# Patient Record
Sex: MALE | Race: WHITE
[De-identification: names, ages, dates, MRNs, and addresses within clinical notes are randomized per-mention and may not be internally consistent; named-entity substitution may affect disease eponyms.]

---

## 2018-08-13 ENCOUNTER — HOSPITAL ENCOUNTER (EMERGENCY)
Dept: HOSPITAL 82 - ED | Age: 63
Discharge: NURSING, REHAB OR LONG-TERM CARE | DRG: 918 | End: 2018-08-13
Payer: COMMERCIAL

## 2018-08-13 VITALS — BODY MASS INDEX: 33.87 KG/M2 | WEIGHT: 198.42 LBS | HEIGHT: 64 IN

## 2018-08-13 VITALS — DIASTOLIC BLOOD PRESSURE: 57 MMHG | SYSTOLIC BLOOD PRESSURE: 106 MMHG

## 2018-08-13 VITALS — DIASTOLIC BLOOD PRESSURE: 56 MMHG | SYSTOLIC BLOOD PRESSURE: 98 MMHG

## 2018-08-13 VITALS — SYSTOLIC BLOOD PRESSURE: 107 MMHG | DIASTOLIC BLOOD PRESSURE: 56 MMHG

## 2018-08-13 DIAGNOSIS — I10: ICD-10-CM

## 2018-08-13 DIAGNOSIS — D64.9: ICD-10-CM

## 2018-08-13 DIAGNOSIS — K62.5: ICD-10-CM

## 2018-08-13 DIAGNOSIS — Z86.718: ICD-10-CM

## 2018-08-13 DIAGNOSIS — T45.515A: Primary | ICD-10-CM

## 2018-08-13 LAB
ALBUMIN SERPL-MCNC: 4.4 G/DL (ref 3.2–5)
ALP SERPL-CCNC: 80 U/L (ref 38–126)
ALT SERPL-CCNC: 35 U/L (ref 11–66)
ANION GAP SERPL CALCULATED.3IONS-SCNC: 17 MMOL/L
AST SERPL-CCNC: 20 U/L (ref 19–48)
BASOPHILS NFR BLD AUTO: 1 % (ref 0–3)
BUN SERPL-MCNC: 17 MG/DL (ref 8–23)
BUN/CREAT SERPL: 14
CHLORIDE SERPL-SCNC: 107 MMOL/L (ref 95–108)
CO2 SERPL-SCNC: 25 MMOL/L (ref 22–30)
CREAT SERPL-MCNC: 1.3 MG/DL (ref 0.7–1.3)
EOSINOPHIL NFR BLD AUTO: 3 % (ref 0–8)
ERYTHROCYTE [DISTWIDTH] IN BLOOD BY AUTOMATED COUNT: 18.6 % (ref 11.5–15.5)
HCT VFR BLD AUTO: 27.5 % (ref 39–50)
HGB BLD-MCNC: 7.6 G/DL (ref 14–18)
IMM GRANULOCYTES NFR BLD: 0.3 % (ref 0–5)
INR PPP: 2.8 RATIO (ref 0.7–1.3)
LYMPHOCYTES NFR BLD: 18 % (ref 15–41)
MCH RBC QN AUTO: 20.2 PG  CALC (ref 26–32)
MCHC RBC AUTO-ENTMCNC: 27.6 G/L CALC (ref 32–36)
MCV RBC AUTO: 72.9 FL  CALC (ref 80–100)
MONOCYTES NFR BLD AUTO: 9 % (ref 2–13)
NEUTROPHILS # BLD AUTO: 4.62 THOU/UL (ref 1.82–7.42)
NEUTROPHILS NFR BLD AUTO: 68 % (ref 42–76)
PLATELET # BLD AUTO: 442 THOU/UL (ref 130–400)
POTASSIUM SERPL-SCNC: 4.7 MMOL/L (ref 3.5–5.1)
PROT SERPL-MCNC: 7.7 G/DL (ref 6.3–8.2)
PROTHROMBIN TIME: 31.7 SECONDS (ref 9–12.5)
RBC # BLD AUTO: 3.77 MILL/UL (ref 4.7–6.1)
SODIUM SERPL-SCNC: 144 MMOL/L (ref 137–146)

## 2018-08-13 PROCEDURE — 30233N1 TRANSFUSION OF NONAUTOLOGOUS RED BLOOD CELLS INTO PERIPHERAL VEIN, PERCUTANEOUS APPROACH: ICD-10-PCS | Performed by: FAMILY MEDICINE

## 2018-08-13 PROCEDURE — P9016 RBC LEUKOCYTES REDUCED: HCPCS

## 2022-03-03 ENCOUNTER — ANTICOAGULATION VISIT (OUTPATIENT)
Dept: CARDIAC SURGERY | Facility: CLINIC | Age: 67
End: 2022-03-03

## 2022-03-03 VITALS — HEART RATE: 110 BPM | OXYGEN SATURATION: 98 %

## 2022-03-03 DIAGNOSIS — I51.3 ATRIAL THROMBUS: ICD-10-CM

## 2022-03-03 DIAGNOSIS — Z51.81 ENCOUNTER FOR THERAPEUTIC DRUG MONITORING: ICD-10-CM

## 2022-03-03 DIAGNOSIS — Z79.01 LONG TERM (CURRENT) USE OF ANTICOAGULANTS: Primary | ICD-10-CM

## 2022-03-03 LAB — INR PPP: 1.3 (ref 0.9–1.1)

## 2022-03-03 PROCEDURE — 85610 PROTHROMBIN TIME: CPT | Performed by: NURSE PRACTITIONER

## 2022-03-03 PROCEDURE — 93793 ANTICOAG MGMT PT WARFARIN: CPT | Performed by: NURSE PRACTITIONER

## 2022-03-03 PROCEDURE — 36416 COLLJ CAPILLARY BLOOD SPEC: CPT | Performed by: NURSE PRACTITIONER

## 2022-03-03 NOTE — PROGRESS NOTES
PT here today for enrollment in [facility]. PT states he has a history of blood clots. PT states he was on coumadin once for about a year and was taken off. PT states he is unsure why he was placed back on coumadin but he has been back on it 2-3 years now. PT states he has a peach colored tablet (5mg) and he has been self dosing. PT states he can tell by the color of his stoma if his blood is thick or thin. PT was advised he would need to follow our instructions for dosing and not self dose; PT verbalized. PT states he will bring a medication list to next appt. PT states he has been taking 1/4 tablet for about a week. PT was given informational packet and key points reviewed. Educated pt on reasoning from Coumadin therapy. Explained mechanism of action with Coumadin and Vitamin k. Risk and benefits of therapy were explained including rationale for INR range. Stressed compliance and consistency with diet, medications and appts. PT was educated on interactions of other medications and instructed to call CC with any med changes immediately including those that are short term. Instructed pt to take Coumadin between supper time and bed time. PT was educated on expectations of visits including intervals and frequencies. Educated pt on dietary restrictions and Coumadin friendly diet. PT was given informational sheets regarding acceptable green vegs. Educated pt on reasoning behind not consuming those fruits and vegs.  PT was instructed to contact CC for any planned medical procedures and to make sure all providers are aware of Coumadin therapy. PT was educated on s/s of bleeding and to report bleeding to ER immediately. PT was advised to wear medical alert bracelet. Emphasized safety while on blood thinner including power tool safety and to always obtain CT scan for head/blunt trauma. PT was instructed that [facility]in Clinic would manage pt's INR and refill Coumadin. PT instructed on dosing, to hod green veggies, and  appt made for 4 days; PT verbalized. Approximately 25 minutes were spent with patient. Findings reported by Kimberly Stauffer RN.    Today's INR is Lab Results - Last 18 Months   Lab Units 03/03/22  0000   INR  1.30*

## 2022-03-07 ENCOUNTER — ANTICOAGULATION VISIT (OUTPATIENT)
Dept: CARDIAC SURGERY | Facility: CLINIC | Age: 67
End: 2022-03-07

## 2022-03-07 VITALS — OXYGEN SATURATION: 94 % | HEART RATE: 87 BPM

## 2022-03-07 DIAGNOSIS — I51.3 ATRIAL THROMBUS: ICD-10-CM

## 2022-03-07 DIAGNOSIS — Z79.01 LONG TERM (CURRENT) USE OF ANTICOAGULANTS: Primary | ICD-10-CM

## 2022-03-07 DIAGNOSIS — Z51.81 ENCOUNTER FOR THERAPEUTIC DRUG MONITORING: ICD-10-CM

## 2022-03-07 LAB — INR PPP: 1.3 (ref 0.9–1.1)

## 2022-03-07 PROCEDURE — 85610 PROTHROMBIN TIME: CPT | Performed by: NURSE PRACTITIONER

## 2022-03-07 PROCEDURE — 36416 COLLJ CAPILLARY BLOOD SPEC: CPT | Performed by: NURSE PRACTITIONER

## 2022-03-07 PROCEDURE — 93793 ANTICOAG MGMT PT WARFARIN: CPT | Performed by: NURSE PRACTITIONER

## 2022-03-07 RX ORDER — ISOSORBIDE DINITRATE 30 MG/1
30 TABLET ORAL DAILY
COMMUNITY
Start: 2022-05-19

## 2022-03-07 RX ORDER — WARFARIN SODIUM 5 MG/1
TABLET ORAL DAILY
COMMUNITY
End: 2022-03-22 | Stop reason: SDUPTHER

## 2022-03-07 RX ORDER — ONDANSETRON 8 MG/1
8 TABLET, ORALLY DISINTEGRATING ORAL
COMMUNITY
Start: 2021-11-04 | End: 2022-05-19

## 2022-03-07 RX ORDER — VITAMIN B COMPLEX
1 TABLET ORAL DAILY
COMMUNITY
Start: 2022-05-19

## 2022-03-07 RX ORDER — FERROUS SULFATE 325(65) MG
65 TABLET ORAL
COMMUNITY
Start: 2022-05-19

## 2022-03-07 RX ORDER — PSEUDOEPHEDRINE HCL 30 MG
200 TABLET ORAL
COMMUNITY
End: 2022-05-19

## 2022-03-07 RX ORDER — BISACODYL 5 MG/1
5 TABLET, DELAYED RELEASE ORAL DAILY PRN
COMMUNITY
Start: 2022-05-19

## 2022-03-07 RX ORDER — UREA 10 %
2 LOTION (ML) TOPICAL
COMMUNITY
End: 2022-05-19

## 2022-03-07 RX ORDER — SENNA PLUS 8.6 MG/1
1 TABLET ORAL EVERY OTHER DAY
COMMUNITY
End: 2022-05-19

## 2022-03-07 RX ORDER — PROCHLORPERAZINE MALEATE 10 MG
10 TABLET ORAL EVERY 6 HOURS PRN
COMMUNITY
Start: 2022-05-19

## 2022-03-07 RX ORDER — HYDROCODONE BITARTRATE AND ACETAMINOPHEN 5; 325 MG/1; MG/1
1 TABLET ORAL
COMMUNITY
Start: 2021-11-08 | End: 2022-05-19

## 2022-03-07 RX ORDER — METOPROLOL SUCCINATE 25 MG/1
12.5 TABLET, EXTENDED RELEASE ORAL
COMMUNITY
End: 2022-05-19

## 2022-03-07 RX ORDER — SACUBITRIL AND VALSARTAN 24; 26 MG/1; MG/1
1 TABLET, FILM COATED ORAL
COMMUNITY
Start: 2021-09-13

## 2022-03-07 RX ORDER — DIPHENOXYLATE HYDROCHLORIDE AND ATROPINE SULFATE 2.5; .025 MG/1; MG/1
1 TABLET ORAL 4 TIMES DAILY PRN
COMMUNITY
Start: 2022-05-19

## 2022-03-07 RX ORDER — ASPIRIN 81 MG/1
81 TABLET ORAL DAILY
COMMUNITY
Start: 2022-05-19

## 2022-03-07 RX ORDER — FUROSEMIDE 40 MG/1
20 TABLET ORAL
COMMUNITY
Start: 2021-12-06

## 2022-03-07 NOTE — PROGRESS NOTES
Pt denies missed coumadin doses or consuming too much green.  Pt states medications have not changed.  I did call [facility] in [place] and verified pt is using warfarin 5mg.  I adjusted coumadin dose and instructed pt to limit green intake for three days.  Recheck INR this Thursday.  Pt verbalizes.  Patient instructed regarding medication; results given and questions answered. Nutritional counseling given.  Dietary factors affecting therapy addressed.  Patient instructed to monitor for excessive bruising or bleeding.  Findings reported by Luis Oliveros RN.   Today's INR is Lab Results - Last 18 Months   Lab Units 03/07/22  0000   INR  1.30*

## 2022-03-10 ENCOUNTER — DOCUMENTATION (OUTPATIENT)
Dept: CARDIAC SURGERY | Facility: CLINIC | Age: 67
End: 2022-03-10

## 2022-03-14 ENCOUNTER — DOCUMENTATION (OUTPATIENT)
Dept: CARDIAC SURGERY | Facility: CLINIC | Age: 67
End: 2022-03-14

## 2022-03-14 NOTE — PROGRESS NOTES
Pt's caregiver called to say pt is in the hospital in [place] and will notify CC upon discharge.  Pt requests she be notified of his CC appts at [phone].  Findings reported by Luis Oliveros RN.

## 2022-03-21 ENCOUNTER — ANTICOAGULATION VISIT (OUTPATIENT)
Dept: CARDIAC SURGERY | Facility: CLINIC | Age: 67
End: 2022-03-21

## 2022-03-21 DIAGNOSIS — Z51.81 ENCOUNTER FOR THERAPEUTIC DRUG MONITORING: ICD-10-CM

## 2022-03-21 DIAGNOSIS — I51.3 ATRIAL THROMBUS: ICD-10-CM

## 2022-03-21 DIAGNOSIS — Z79.01 LONG TERM (CURRENT) USE OF ANTICOAGULANTS: Primary | ICD-10-CM

## 2022-03-21 LAB — INR PPP: 2.42

## 2022-03-21 PROCEDURE — 93793 ANTICOAG MGMT PT WARFARIN: CPT | Performed by: NURSE PRACTITIONER

## 2022-03-21 NOTE — PROGRESS NOTES
I spoke to CHOLO Barajas with [facility] over the phone.  Pt was discharged home from [facility] last Thursday, March 17.  Coumadin dosing from last Monday is reflected on dosing calendar.  I verified pt has been taking coumadin 5mg nightly since discharge.  I instructed for pt to continue coumadin 5mg every evening and instructed to recheck INR on Monday, March 28th.  Karl verbalized instructions.  I spoke to Suzy Lynn, pt's caregiver and gave same instructions.  Findings reported by Luis Oliveros RN.   Today's INR is Lab Results - Last 18 Months   Lab Units 03/21/22  1055   INR  2.42*

## 2022-03-22 RX ORDER — WARFARIN SODIUM 5 MG/1
TABLET ORAL
Qty: 30 TABLET | Refills: 0 | Status: SHIPPED | OUTPATIENT
Start: 2022-03-22

## 2022-03-28 ENCOUNTER — DOCUMENTATION (OUTPATIENT)
Dept: CARDIAC SURGERY | Facility: CLINIC | Age: 67
End: 2022-03-28

## 2022-03-28 NOTE — PROGRESS NOTES
I spoke to CHOLO Barajas  With [facility] over the phone.  Per Karl, staffing has been unable to reach patient by phone or obtain a new residence address for his HH visits and INR rechecks.  I spoke to pt's friend, Suzy Lynn, over the phone who states pt left last week and has not returned, she does not know where pt is staying either.  I left patient a message on voicemail to notify CC of his new address for HH visits.  Findings reported by Luis Oliveros RN.

## 2022-04-28 ENCOUNTER — HOSPICE ADMISSION (OUTPATIENT)
Dept: HOSPICE | Facility: HOSPICE | Age: 67
End: 2022-04-28

## 2022-04-28 ENCOUNTER — HOME CARE VISIT (OUTPATIENT)
Dept: HOME HEALTH SERVICES | Facility: CLINIC | Age: 67
End: 2022-04-28

## 2022-04-28 ENCOUNTER — TRANSCRIBE ORDERS (OUTPATIENT)
Dept: HOME HEALTH SERVICES | Facility: CLINIC | Age: 67
End: 2022-04-28

## 2022-04-28 DIAGNOSIS — C20 RECTAL CANCER: Primary | ICD-10-CM

## 2022-05-19 ENCOUNTER — HOME CARE VISIT (OUTPATIENT)
Dept: HOSPICE | Facility: HOSPICE | Age: 67
End: 2022-05-19

## 2022-05-19 ENCOUNTER — HOME CARE VISIT (OUTPATIENT)
Dept: HOME HEALTH SERVICES | Facility: CLINIC | Age: 67
End: 2022-05-19

## 2022-05-19 VITALS
WEIGHT: 170 LBS | HEIGHT: 64 IN | SYSTOLIC BLOOD PRESSURE: 140 MMHG | BODY MASS INDEX: 29.02 KG/M2 | TEMPERATURE: 98.8 F | HEART RATE: 82 BPM | DIASTOLIC BLOOD PRESSURE: 80 MMHG

## 2022-05-19 DIAGNOSIS — Z51.5 HOSPICE CARE: Primary | ICD-10-CM

## 2022-05-19 PROCEDURE — G0299 HHS/HOSPICE OF RN EA 15 MIN: HCPCS

## 2022-05-19 PROCEDURE — 6520001 HSPC ROUTINE CARE

## 2022-05-19 RX ORDER — MORPHINE SULFATE 15 MG/1
15 TABLET, FILM COATED, EXTENDED RELEASE ORAL 2 TIMES DAILY
Qty: 30 TABLET | Refills: 0 | Status: SHIPPED | OUTPATIENT
Start: 2022-05-19 | End: 2022-05-31

## 2022-05-19 RX ORDER — LORAZEPAM 2 MG/ML
1 CONCENTRATE ORAL EVERY 4 HOURS PRN
Qty: 30 ML | Refills: 2 | Status: SHIPPED | OUTPATIENT
Start: 2022-05-19 | End: 2022-05-31

## 2022-05-20 ENCOUNTER — HOME CARE VISIT (OUTPATIENT)
Dept: HOME HEALTH SERVICES | Facility: CLINIC | Age: 67
End: 2022-05-20

## 2022-05-20 ENCOUNTER — HOME CARE VISIT (OUTPATIENT)
Dept: HOSPICE | Facility: HOSPICE | Age: 67
End: 2022-05-20

## 2022-05-20 PROCEDURE — 6520001 HSPC ROUTINE CARE

## 2022-05-20 NOTE — HOSPICE
"Explained Hospice Benefits and Services  CPR discussion: pt does not want CPR,  IV's/ IV antibiotics, tube feeding, dialysis, intubation.  Hospitalization discussion:  does not want to go back to the hospital  Spiritual/existential concerns discussed: pt stated that he did not have any concerns  Dyspnea: none  Admission book explained and instructions given on hospice availability and how to access nurse .  Signee introduced the Booklet, Gone From My Sight.    Pt reported the followin year ago Danielle, he had surgery. While waiting on his insurance to decide who was going to pay what, it was to late and diagnosed with rectal cancel.  He had been able to \"get around pretty good\" until a recent infection which was treated.  Now he is facing and trying to deal with the reality of his condition.  He stated that the anxiety was worse than the pain.  Signee contacted Dr. Beltre and received order for lorazepam and morphine.  Instructions given to pt on the directions of each.  REAL Wynn agreed to  new meds at  Pharmacy and call to read off the prescription information.    Signee offerred emotional support and active listening."

## 2022-05-21 PROCEDURE — 6520001 HSPC ROUTINE CARE

## 2022-05-22 PROCEDURE — 6520001 HSPC ROUTINE CARE

## 2022-05-23 ENCOUNTER — ANTICOAGULATION VISIT (OUTPATIENT)
Dept: CARDIAC SURGERY | Facility: CLINIC | Age: 67
End: 2022-05-23

## 2022-05-23 ENCOUNTER — HOME CARE VISIT (OUTPATIENT)
Dept: HOME HEALTH SERVICES | Facility: CLINIC | Age: 67
End: 2022-05-23

## 2022-05-23 ENCOUNTER — HOME CARE VISIT (OUTPATIENT)
Dept: HOSPICE | Facility: HOSPICE | Age: 67
End: 2022-05-23

## 2022-05-23 DIAGNOSIS — I51.3 ATRIAL THROMBUS: ICD-10-CM

## 2022-05-23 DIAGNOSIS — Z51.81 ENCOUNTER FOR THERAPEUTIC DRUG MONITORING: ICD-10-CM

## 2022-05-23 DIAGNOSIS — Z79.01 LONG TERM (CURRENT) USE OF ANTICOAGULANTS: Primary | ICD-10-CM

## 2022-05-23 PROCEDURE — 6520001 HSPC ROUTINE CARE

## 2022-05-23 PROCEDURE — G0155 HHCP-SVS OF CSW,EA 15 MIN: HCPCS

## 2022-05-23 NOTE — PROGRESS NOTES
Pt is overdue for INR but was recently admitted to [facility]. I contacted [facility] who stated pt is still taking warfarin but he is refusing all lab draws and finger sticks. Will resolve episode as he is now under the care of his hospice physician and is refusing labs.   Findings reported by Kimberly Stauffer RN.

## 2022-05-24 PROCEDURE — 6520001 HSPC ROUTINE CARE
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
## 2022-05-25 ENCOUNTER — HOME CARE VISIT (OUTPATIENT)
Dept: HOME HEALTH SERVICES | Facility: CLINIC | Age: 67
End: 2022-05-25

## 2022-05-25 VITALS
DIASTOLIC BLOOD PRESSURE: 80 MMHG | SYSTOLIC BLOOD PRESSURE: 106 MMHG | RESPIRATION RATE: 20 BRPM | TEMPERATURE: 98.4 F | HEART RATE: 120 BPM

## 2022-05-25 DIAGNOSIS — Z51.5 HOSPICE CARE: Primary | ICD-10-CM

## 2022-05-25 PROCEDURE — 6520001 HSPC ROUTINE CARE

## 2022-05-25 PROCEDURE — G0299 HHS/HOSPICE OF RN EA 15 MIN: HCPCS

## 2022-05-25 RX ORDER — MORPHINE SULFATE 15 MG/1
1 TABLET ORAL
Qty: 48 TABLET | Refills: 0 | Status: ON HOLD | OUTPATIENT
Start: 2022-05-25 | End: 2022-07-29 | Stop reason: SDUPTHER

## 2022-05-25 RX ORDER — MORPHINE SULFATE 15 MG/1
30 TABLET, FILM COATED, EXTENDED RELEASE ORAL 2 TIMES DAILY
Qty: 30 TABLET | Refills: 0 | Status: SHIPPED | OUTPATIENT
Start: 2022-05-25 | End: 2022-07-29 | Stop reason: HOSPADM

## 2022-05-26 PROCEDURE — 6520001 HSPC ROUTINE CARE

## 2022-05-26 NOTE — HOSPICE
The patient indicated he was not in pain. There were visible fall risks. I will visit 1xs, monthly.    I spoke with the patient's caregiver before I entered the patient's bedroom to speak with him. We talked about recent events and family history.    I offered emotional encouragement and a listening presence.

## 2022-05-26 NOTE — HOSPICE
Pt. sitting on bed and became more anxious during assessment but asked for medication from [facility].  Discussed pt. symptoms and needs and agreed on medication adjustments that could be helpful and indications for meds.  Pt. stated he needed some supplies for colostomy and contacted office to see if supplies available.  Pt. voiced understanding to call Hospice if changes or concerns.

## 2022-05-27 PROCEDURE — 6520001 HSPC ROUTINE CARE

## 2022-05-28 PROCEDURE — 6520001 HSPC ROUTINE CARE

## 2022-05-29 PROCEDURE — 6520001 HSPC ROUTINE CARE

## 2022-05-30 PROCEDURE — 6520001 HSPC ROUTINE CARE

## 2022-05-31 PROCEDURE — 6520001 HSPC ROUTINE CARE

## 2022-06-01 ENCOUNTER — HOME CARE VISIT (OUTPATIENT)
Dept: HOME HEALTH SERVICES | Facility: CLINIC | Age: 67
End: 2022-06-01

## 2022-06-01 VITALS
TEMPERATURE: 97.8 F | HEART RATE: 120 BPM | DIASTOLIC BLOOD PRESSURE: 80 MMHG | SYSTOLIC BLOOD PRESSURE: 120 MMHG | RESPIRATION RATE: 20 BRPM

## 2022-06-01 PROCEDURE — 6520001 HSPC ROUTINE CARE

## 2022-06-01 PROCEDURE — G0299 HHS/HOSPICE OF RN EA 15 MIN: HCPCS

## 2022-06-01 NOTE — HOSPICE
Pt laying in bed on arrival, stated vas 7 but had taken some medication and stated he has good days and bad but usually after a good day he runs hard and has a bad day.  States pain is controled to his level of comfort with meds as prescribed.  Says pain is a cramping in rectum and is intermittent so takes short acting meds as needed.  Says the lorazepam works on his anxiety and he has learned to take it before he goes out.  Said he went out to grocery and then to dinner and symptoms kept controlled.   came to visit and brought supplies and boost.  Voiced he would call him if any needs and welcomed him to visit again, stated he got phone numbers to try to contact daughter.  Pt. voiced he understands to call if changes or needs.

## 2022-06-02 ENCOUNTER — HOME CARE VISIT (OUTPATIENT)
Dept: HOME HEALTH SERVICES | Facility: CLINIC | Age: 67
End: 2022-06-02

## 2022-06-02 PROCEDURE — G0155 HHCP-SVS OF CSW,EA 15 MIN: HCPCS

## 2022-06-02 PROCEDURE — 6520001 HSPC ROUTINE CARE

## 2022-06-02 NOTE — HOSPICE
Arrived at patient's home finding him alone. Patient denied pain and said that he has been following regiment set out by RN and that it has helped him.   Patient shared about family history and how he doesn't have anyone he could trust to be Health Care Surrogate. Discussion ensued about patient contacting daughter and see how things go at that point.  Patient had questions about financial Will and Signee referred to an . Patient said that he planned to go to [place] to get business settled.     Patient agreed to contact office a couple days before leaving to go to [place] so we can contact Hospice agency in that area to provide supportive services.     Patient verbalized understanding to contact RN 24/7 and to call Signee as needed.

## 2022-06-02 NOTE — HOSPICE
The patient indicated his pain level to our nurse who was present. There were visible fall risks. I delievered supplies, per our nurse - Boost.    The patient, our nurse, and I spoke in the patient's bedroom regarding his pain and what the patient has done in recent days.    I offered encouragement, and a listening and spiritual presence.

## 2022-06-03 PROCEDURE — 6520001 HSPC ROUTINE CARE

## 2022-06-04 PROCEDURE — 6520001 HSPC ROUTINE CARE

## 2022-06-05 ENCOUNTER — HOME CARE VISIT (OUTPATIENT)
Dept: HOSPICE | Facility: HOSPICE | Age: 67
End: 2022-06-05

## 2022-06-05 PROCEDURE — 6520001 HSPC ROUTINE CARE

## 2022-06-06 PROCEDURE — 6520001 HSPC ROUTINE CARE

## 2022-06-07 PROCEDURE — 6520001 HSPC ROUTINE CARE

## 2022-06-08 PROCEDURE — 6520001 HSPC ROUTINE CARE

## 2022-06-09 ENCOUNTER — HOME CARE VISIT (OUTPATIENT)
Dept: HOSPICE | Facility: HOSPICE | Age: 67
End: 2022-06-09

## 2022-06-09 PROCEDURE — G0299 HHS/HOSPICE OF RN EA 15 MIN: HCPCS

## 2022-06-09 PROCEDURE — 6520001 HSPC ROUTINE CARE

## 2022-06-10 VITALS
SYSTOLIC BLOOD PRESSURE: 108 MMHG | DIASTOLIC BLOOD PRESSURE: 82 MMHG | HEART RATE: 88 BPM | RESPIRATION RATE: 16 BRPM | TEMPERATURE: 98.8 F

## 2022-06-10 PROCEDURE — 6520001 HSPC ROUTINE CARE

## 2022-06-10 NOTE — HOSPICE
Pt. awake at arrival, able to void into urinal now that swelling has improved.  States pain controlled better for him now at a level of 5 is good for him.  States eating ok and using some boost to supplement.  Says the swelling has improved on ankles and getting around better.  Says because of meds not driving.  Voiced understanding to call Hospice if any concerns or needs.  Refills ordered from [facility].

## 2022-06-11 PROCEDURE — 6520001 HSPC ROUTINE CARE

## 2022-06-12 PROCEDURE — 6520001 HSPC ROUTINE CARE

## 2022-06-13 PROCEDURE — 6520001 HSPC ROUTINE CARE

## 2022-06-14 PROCEDURE — 6520001 HSPC ROUTINE CARE

## 2022-06-15 PROCEDURE — 6520001 HSPC ROUTINE CARE

## 2022-06-16 PROCEDURE — 6520001 HSPC ROUTINE CARE

## 2022-06-17 ENCOUNTER — HOME CARE VISIT (OUTPATIENT)
Dept: HOSPICE | Facility: HOSPICE | Age: 67
End: 2022-06-17

## 2022-06-17 PROCEDURE — 6520001 HSPC ROUTINE CARE

## 2022-06-18 PROCEDURE — 6520001 HSPC ROUTINE CARE

## 2022-06-19 ENCOUNTER — HOME CARE VISIT (OUTPATIENT)
Dept: HOSPICE | Facility: HOSPICE | Age: 67
End: 2022-06-19

## 2022-06-19 PROCEDURE — 6520001 HSPC ROUTINE CARE

## 2022-06-19 PROCEDURE — G0299 HHS/HOSPICE OF RN EA 15 MIN: HCPCS

## 2022-06-20 VITALS
DIASTOLIC BLOOD PRESSURE: 60 MMHG | SYSTOLIC BLOOD PRESSURE: 96 MMHG | TEMPERATURE: 98.9 F | RESPIRATION RATE: 20 BRPM | HEART RATE: 100 BPM

## 2022-06-20 PROCEDURE — 6520001 HSPC ROUTINE CARE

## 2022-06-20 NOTE — HOSPICE
Pt. had just returned from the grocery at arrival.  Stated he was feeling better but had to pace himself and monitor edema on feet/ankles.  Stated pain managed to tolerable level as long as he takes the long acting medication on time and he has been taking promethazine with pain medication once sometimes 2 times a day and states feels like eating better and sleeps better.  Pt. requested INR and it was performed with reading 3.9.  He stated he would hold the medication a day and keep observing his ostomy for irritation, bleeding, and color.  Anxiety is still a problem and he said the medication helps but doesn't last long enough when out of home so pill form ordered.  Pt. states he has been eating better last couple weeks and able to be more active within limits.  Pt. needed ostomy supplies but could not find the type he used and liked.  Will have someone take a sample of what is available in office at present to see what works and he also requested some boost.  Pt. stated he wants a one piece colostomy bag setup because he states he moves to much and detaches the 2 piece system.  Pt. and roommate state they will call if any changes or concerns.

## 2022-06-21 ENCOUNTER — HOME CARE VISIT (OUTPATIENT)
Dept: HOSPICE | Facility: HOSPICE | Age: 67
End: 2022-06-21

## 2022-06-21 PROCEDURE — 6520001 HSPC ROUTINE CARE

## 2022-06-22 PROCEDURE — 6520001 HSPC ROUTINE CARE

## 2022-06-23 PROCEDURE — 6520001 HSPC ROUTINE CARE

## 2022-06-24 PROCEDURE — 6520001 HSPC ROUTINE CARE

## 2022-06-25 PROCEDURE — 6520001 HSPC ROUTINE CARE

## 2022-06-26 PROCEDURE — 6520001 HSPC ROUTINE CARE

## 2022-06-27 ENCOUNTER — HOME CARE VISIT (OUTPATIENT)
Dept: HOSPICE | Facility: HOSPICE | Age: 67
End: 2022-06-27

## 2022-06-27 PROCEDURE — G0299 HHS/HOSPICE OF RN EA 15 MIN: HCPCS

## 2022-06-27 PROCEDURE — 6520001 HSPC ROUTINE CARE

## 2022-06-27 NOTE — HOSPICE
The patient indicated he had no pain. There were visible fall risks. I delivered supplies - assorted colostomy bags.    The patient and I spoke in the patient's bedroom about recent events in the patient's life.    I offered , encouragement, and a listening and spiritual presence.

## 2022-06-28 PROCEDURE — 6520001 HSPC ROUTINE CARE
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
## 2022-06-29 ENCOUNTER — HOME CARE VISIT (OUTPATIENT)
Dept: HOSPICE | Facility: HOSPICE | Age: 67
End: 2022-06-29

## 2022-06-29 ENCOUNTER — HOME CARE VISIT (OUTPATIENT)
Dept: HOME HEALTH SERVICES | Facility: CLINIC | Age: 67
End: 2022-06-29

## 2022-06-29 PROCEDURE — 6520001 HSPC ROUTINE CARE

## 2022-06-30 PROCEDURE — 6520001 HSPC ROUTINE CARE

## 2022-06-30 NOTE — HOSPICE
TmcThe patient said he was not in pain. There were no visible fall risks.    I visited the patient in the hospital in [place], [place]. We talked about the reason for his admission. I shared with him that he needed to call hospice the next time before he went to the hospital. I also told him he would be responsible for any hospital bills he would incur during this admission. The caregiver was also told the same things. I facilitated the patient's transportation back to the caregiver's home.    I offered , understanding, and emotional encouragement.

## 2022-07-01 ENCOUNTER — HOME CARE VISIT (OUTPATIENT)
Dept: HOME HEALTH SERVICES | Facility: CLINIC | Age: 67
End: 2022-07-01

## 2022-07-01 ENCOUNTER — HOME CARE VISIT (OUTPATIENT)
Dept: HOSPICE | Facility: HOSPICE | Age: 67
End: 2022-07-01

## 2022-07-01 VITALS
RESPIRATION RATE: 16 BRPM | HEART RATE: 92 BPM | TEMPERATURE: 99.2 F | SYSTOLIC BLOOD PRESSURE: 110 MMHG | DIASTOLIC BLOOD PRESSURE: 70 MMHG

## 2022-07-01 PROCEDURE — G0299 HHS/HOSPICE OF RN EA 15 MIN: HCPCS

## 2022-07-01 PROCEDURE — 6520001 HSPC ROUTINE CARE

## 2022-07-02 PROCEDURE — 6520001 HSPC ROUTINE CARE

## 2022-07-02 NOTE — HOSPICE
Called pt. and he stated he was home for this SN and  to visit.  On arrival he was not present, contacted property owner and he informed that patient moved to stay with friends.  Contacted friend and got location, stated pt. was laying down but got the address.  Traveled to new address to meet with patient.  He stated he wanted to live with them because bugs at other location (not observed) but stated he could see the insects crawling on him during visit and assessment (also not observed).  Friends stated he told them he wanted to stay there and they moved a trailer in and trying to get it in condition to live which included getting all utilities connected and cleaning up the interior.  There was a room in the trailer sectioned off with portable a/c unit and fan running.  Patient stated he wants to stay with these people and they will care for him.  Looked at pts. bottles of comfort meds and meds supplied by Hospice and he had enough supply until next visit next week.  Pt. states he is taking long acting pain medication and not needing PRN pain meds as often as he can have them.  Obtained contact phone numbers of those people present that stated they would help patient care for his needs.

## 2022-07-03 PROCEDURE — 6520001 HSPC ROUTINE CARE

## 2022-07-03 NOTE — HOSPICE
The patient expressed his pain level to our nurse who was present. There were mulitiple, visible fall risks.    The patient has moved from his preious place of residence. The trailer where the patient is living has no running water and there are other troubling issues present.    The patient, the two self-declared caregivers, our nurse, and our paitent talked about the patient's change of residence and we did a retelling of the structure of hospice.    I offered , encouragement, and a listening presence.

## 2022-07-04 PROCEDURE — 6520001 HSPC ROUTINE CARE

## 2022-07-05 PROCEDURE — 6520001 HSPC ROUTINE CARE

## 2022-07-06 PROCEDURE — 6520001 HSPC ROUTINE CARE

## 2022-07-07 ENCOUNTER — HOME CARE VISIT (OUTPATIENT)
Dept: HOSPICE | Facility: HOSPICE | Age: 67
End: 2022-07-07

## 2022-07-07 PROCEDURE — 6520001 HSPC ROUTINE CARE

## 2022-07-08 ENCOUNTER — HOME CARE VISIT (OUTPATIENT)
Dept: HOSPICE | Facility: HOSPICE | Age: 67
End: 2022-07-08

## 2022-07-08 PROCEDURE — 6520001 HSPC ROUTINE CARE

## 2022-07-08 NOTE — HOSPICE
The patient said his pain level was a 6. I gave that information to the patient's nurse. I brought supplies - Boost and colostomy bag supplies.    The patient, one of the patient's caregivers, and I talked about new plans the patient was planning.    I offered , encouragement, and a listening presence.

## 2022-07-09 PROCEDURE — 6520001 HSPC ROUTINE CARE

## 2022-07-10 PROCEDURE — 6520001 HSPC ROUTINE CARE

## 2022-07-11 ENCOUNTER — HOME CARE VISIT (OUTPATIENT)
Dept: HOME HEALTH SERVICES | Facility: CLINIC | Age: 67
End: 2022-07-11

## 2022-07-11 ENCOUNTER — HOME CARE VISIT (OUTPATIENT)
Dept: HOSPICE | Facility: HOSPICE | Age: 67
End: 2022-07-11

## 2022-07-11 PROCEDURE — G0299 HHS/HOSPICE OF RN EA 15 MIN: HCPCS

## 2022-07-11 PROCEDURE — 6520001 HSPC ROUTINE CARE

## 2022-07-12 PROCEDURE — 6520001 HSPC ROUTINE CARE

## 2022-07-12 NOTE — HOSPICE
Accompanied CHOLO Garsia for a SN visit to address safety concerns and address that patient would be living at.  Patient not there, and Alex reported that patient and Thai had just left.  Alex attempted to call patient, no answer.  Signee explained to Alex the importance of patient maintaining contact with Hospice.  Alex verbalized understanding, and said that he would tell patient to call Hospice.

## 2022-07-13 PROCEDURE — 6520001 HSPC ROUTINE CARE

## 2022-07-14 PROCEDURE — 6520001 HSPC ROUTINE CARE

## 2022-07-15 PROCEDURE — 6520001 HSPC ROUTINE CARE

## 2022-07-16 PROCEDURE — 6520001 HSPC ROUTINE CARE

## 2022-07-17 PROCEDURE — 6520001 HSPC ROUTINE CARE

## 2022-07-18 ENCOUNTER — APPOINTMENT (OUTPATIENT)
Dept: ULTRASOUND IMAGING | Facility: HOSPITAL | Age: 67
End: 2022-07-18

## 2022-07-18 ENCOUNTER — HOME CARE VISIT (OUTPATIENT)
Dept: HOSPICE | Facility: HOSPICE | Age: 67
End: 2022-07-18

## 2022-07-18 ENCOUNTER — HOSPITAL ENCOUNTER (INPATIENT)
Facility: HOSPITAL | Age: 67
LOS: 11 days | Discharge: NURSING, REHAB OR LONG-TERM CARE | End: 2022-07-29
Attending: EMERGENCY MEDICINE | Admitting: HOSPITALIST

## 2022-07-18 ENCOUNTER — APPOINTMENT (OUTPATIENT)
Dept: INTERVENTIONAL RADIOLOGY/VASCULAR | Facility: HOSPITAL | Age: 67
End: 2022-07-18

## 2022-07-18 ENCOUNTER — APPOINTMENT (OUTPATIENT)
Dept: CT IMAGING | Facility: HOSPITAL | Age: 67
End: 2022-07-18

## 2022-07-18 ENCOUNTER — APPOINTMENT (OUTPATIENT)
Dept: GENERAL RADIOLOGY | Facility: HOSPITAL | Age: 67
End: 2022-07-18

## 2022-07-18 DIAGNOSIS — G93.40 SEPSIS WITH ENCEPHALOPATHY WITHOUT SEPTIC SHOCK, DUE TO UNSPECIFIED ORGANISM: ICD-10-CM

## 2022-07-18 DIAGNOSIS — Z78.9 IMPAIRED MOBILITY AND ADLS: ICD-10-CM

## 2022-07-18 DIAGNOSIS — R41.82 ALTERED MENTAL STATUS, UNSPECIFIED ALTERED MENTAL STATUS TYPE: Primary | ICD-10-CM

## 2022-07-18 DIAGNOSIS — A41.9 SEPSIS WITH ENCEPHALOPATHY WITHOUT SEPTIC SHOCK, DUE TO UNSPECIFIED ORGANISM: ICD-10-CM

## 2022-07-18 DIAGNOSIS — Z74.09 IMPAIRED FUNCTIONAL MOBILITY, BALANCE, GAIT, AND ENDURANCE: ICD-10-CM

## 2022-07-18 DIAGNOSIS — Z74.09 IMPAIRED MOBILITY AND ADLS: ICD-10-CM

## 2022-07-18 DIAGNOSIS — C20 RECTAL CANCER: ICD-10-CM

## 2022-07-18 DIAGNOSIS — R65.20 SEPSIS WITH ENCEPHALOPATHY WITHOUT SEPTIC SHOCK, DUE TO UNSPECIFIED ORGANISM: ICD-10-CM

## 2022-07-18 DIAGNOSIS — J96.01 ACUTE RESPIRATORY FAILURE WITH HYPOXIA: ICD-10-CM

## 2022-07-18 DIAGNOSIS — Z51.5 HOSPICE CARE: ICD-10-CM

## 2022-07-18 LAB
ALBUMIN SERPL-MCNC: 3.6 G/DL (ref 3.5–5.2)
ALBUMIN/GLOB SERPL: 0.8 G/DL
ALP SERPL-CCNC: 110 U/L (ref 39–117)
ALT SERPL W P-5'-P-CCNC: 11 U/L (ref 1–41)
ANION GAP SERPL CALCULATED.3IONS-SCNC: 12 MMOL/L (ref 5–15)
APTT PPP: 61.2 SECONDS (ref 20–40.3)
ARTERIAL PATENCY WRIST A: ABNORMAL
ARTERIAL PATENCY WRIST A: ABNORMAL
AST SERPL-CCNC: 28 U/L (ref 1–40)
ATMOSPHERIC PRESS: 745 MMHG
ATMOSPHERIC PRESS: 746 MMHG
BASE EXCESS BLDA CALC-SCNC: -3.4 MMOL/L (ref 0–2)
BASE EXCESS BLDA CALC-SCNC: 0.2 MMOL/L (ref 0–2)
BASOPHILS # BLD AUTO: 0.06 10*3/MM3 (ref 0–0.2)
BASOPHILS NFR BLD AUTO: 0.5 % (ref 0–1.5)
BDY SITE: ABNORMAL
BDY SITE: ABNORMAL
BILIRUB SERPL-MCNC: 1.1 MG/DL (ref 0–1.2)
BUN SERPL-MCNC: 27 MG/DL (ref 8–23)
BUN/CREAT SERPL: 16.5 (ref 7–25)
CALCIUM SPEC-SCNC: 8.7 MG/DL (ref 8.6–10.5)
CHLORIDE SERPL-SCNC: 98 MMOL/L (ref 98–107)
CO2 SERPL-SCNC: 26 MMOL/L (ref 22–29)
CREAT SERPL-MCNC: 1.64 MG/DL (ref 0.76–1.27)
D-LACTATE SERPL-SCNC: 1.2 MMOL/L (ref 0.5–2)
D-LACTATE SERPL-SCNC: 1.2 MMOL/L (ref 0.5–2)
DEPRECATED RDW RBC AUTO: 65 FL (ref 37–54)
EGFRCR SERPLBLD CKD-EPI 2021: 46.1 ML/MIN/1.73
EOSINOPHIL # BLD AUTO: 0.27 10*3/MM3 (ref 0–0.4)
EOSINOPHIL NFR BLD AUTO: 2.2 % (ref 0.3–6.2)
ERYTHROCYTE [DISTWIDTH] IN BLOOD BY AUTOMATED COUNT: 19.4 % (ref 12.3–15.4)
FLUAV RNA RESP QL NAA+PROBE: NOT DETECTED
FLUBV RNA RESP QL NAA+PROBE: NOT DETECTED
GAS FLOW AIRWAY: 4 LPM
GAS FLOW AIRWAY: 8 LPM
GLOBULIN UR ELPH-MCNC: 4.5 GM/DL
GLUCOSE BLDC GLUCOMTR-MCNC: 242 MG/DL (ref 70–130)
GLUCOSE SERPL-MCNC: 106 MG/DL (ref 65–99)
HCO3 BLDA-SCNC: 23.8 MMOL/L (ref 20–26)
HCO3 BLDA-SCNC: 26.8 MMOL/L (ref 20–26)
HCT VFR BLD AUTO: 33.7 % (ref 37.5–51)
HGB BLD-MCNC: 10.7 G/DL (ref 13–17.7)
HOLD SPECIMEN: NORMAL
IMM GRANULOCYTES # BLD AUTO: 0.1 10*3/MM3 (ref 0–0.05)
IMM GRANULOCYTES NFR BLD AUTO: 0.8 % (ref 0–0.5)
INHALED O2 CONCENTRATION: <21 %
INHALED O2 CONCENTRATION: <21 %
INR PPP: 4.55 (ref 0.8–1.2)
LIPASE SERPL-CCNC: 11 U/L (ref 13–60)
LYMPHOCYTES # BLD AUTO: 0.93 10*3/MM3 (ref 0.7–3.1)
LYMPHOCYTES NFR BLD AUTO: 7.4 % (ref 19.6–45.3)
Lab: ABNORMAL
Lab: ABNORMAL
MAGNESIUM SERPL-MCNC: 2.5 MG/DL (ref 1.6–2.4)
MCH RBC QN AUTO: 29.1 PG (ref 26.6–33)
MCHC RBC AUTO-ENTMCNC: 31.8 G/DL (ref 31.5–35.7)
MCV RBC AUTO: 91.6 FL (ref 79–97)
MODALITY: ABNORMAL
MODALITY: ABNORMAL
MONOCYTES # BLD AUTO: 1.09 10*3/MM3 (ref 0.1–0.9)
MONOCYTES NFR BLD AUTO: 8.7 % (ref 5–12)
NEUTROPHILS NFR BLD AUTO: 10.09 10*3/MM3 (ref 1.7–7)
NEUTROPHILS NFR BLD AUTO: 80.4 % (ref 42.7–76)
NRBC BLD AUTO-RTO: 0 /100 WBC (ref 0–0.2)
PCO2 BLDA: 52 MM HG (ref 35–45)
PCO2 BLDA: 52.1 MM HG (ref 35–45)
PH BLDA: 7.27 PH UNITS (ref 7.35–7.45)
PH BLDA: 7.32 PH UNITS (ref 7.35–7.45)
PLATELET # BLD AUTO: 401 10*3/MM3 (ref 140–450)
PMV BLD AUTO: 10.3 FL (ref 6–12)
PO2 BLDA: 104 MM HG (ref 83–108)
PO2 BLDA: 82.1 MM HG (ref 83–108)
POTASSIUM SERPL-SCNC: 4.2 MMOL/L (ref 3.5–5.2)
PROT SERPL-MCNC: 8.1 G/DL (ref 6–8.5)
PROTHROMBIN TIME: 43.3 SECONDS (ref 11.1–15.3)
QT INTERVAL: 428 MS
QTC INTERVAL: 589 MS
RBC # BLD AUTO: 3.68 10*6/MM3 (ref 4.14–5.8)
SAO2 % BLDCOA: 94.4 % (ref 94–99)
SAO2 % BLDCOA: 98 % (ref 94–99)
SARS-COV-2 RNA RESP QL NAA+PROBE: NOT DETECTED
SODIUM SERPL-SCNC: 136 MMOL/L (ref 136–145)
TROPONIN T SERPL-MCNC: 0.02 NG/ML (ref 0–0.03)
TROPONIN T SERPL-MCNC: 0.03 NG/ML (ref 0–0.03)
VENTILATOR MODE: ABNORMAL
VENTILATOR MODE: ABNORMAL
WBC NRBC COR # BLD: 12.54 10*3/MM3 (ref 3.4–10.8)
WHOLE BLOOD HOLD SPECIMEN: NORMAL
WHOLE BLOOD HOLD SPECIMEN: NORMAL

## 2022-07-18 PROCEDURE — 85025 COMPLETE CBC W/AUTO DIFF WBC: CPT | Performed by: EMERGENCY MEDICINE

## 2022-07-18 PROCEDURE — 83735 ASSAY OF MAGNESIUM: CPT | Performed by: EMERGENCY MEDICINE

## 2022-07-18 PROCEDURE — 25010000002 VANCOMYCIN 10 G RECONSTITUTED SOLUTION: Performed by: EMERGENCY MEDICINE

## 2022-07-18 PROCEDURE — 84484 ASSAY OF TROPONIN QUANT: CPT | Performed by: EMERGENCY MEDICINE

## 2022-07-18 PROCEDURE — 80053 COMPREHEN METABOLIC PANEL: CPT | Performed by: EMERGENCY MEDICINE

## 2022-07-18 PROCEDURE — 70450 CT HEAD/BRAIN W/O DYE: CPT

## 2022-07-18 PROCEDURE — 99285 EMERGENCY DEPT VISIT HI MDM: CPT

## 2022-07-18 PROCEDURE — 25010000002 PIPERACILLIN SOD-TAZOBACTAM PER 1 G: Performed by: EMERGENCY MEDICINE

## 2022-07-18 PROCEDURE — 82962 GLUCOSE BLOOD TEST: CPT

## 2022-07-18 PROCEDURE — 94799 UNLISTED PULMONARY SVC/PX: CPT

## 2022-07-18 PROCEDURE — 93010 ELECTROCARDIOGRAM REPORT: CPT | Performed by: INTERNAL MEDICINE

## 2022-07-18 PROCEDURE — 85610 PROTHROMBIN TIME: CPT | Performed by: EMERGENCY MEDICINE

## 2022-07-18 PROCEDURE — 83690 ASSAY OF LIPASE: CPT | Performed by: EMERGENCY MEDICINE

## 2022-07-18 PROCEDURE — 83605 ASSAY OF LACTIC ACID: CPT | Performed by: EMERGENCY MEDICINE

## 2022-07-18 PROCEDURE — 85730 THROMBOPLASTIN TIME PARTIAL: CPT | Performed by: EMERGENCY MEDICINE

## 2022-07-18 PROCEDURE — 74177 CT ABD & PELVIS W/CONTRAST: CPT

## 2022-07-18 PROCEDURE — 36415 COLL VENOUS BLD VENIPUNCTURE: CPT | Performed by: EMERGENCY MEDICINE

## 2022-07-18 PROCEDURE — 25010000002 CEFTRIAXONE PER 250 MG: Performed by: HOSPITALIST

## 2022-07-18 PROCEDURE — 83605 ASSAY OF LACTIC ACID: CPT | Performed by: HOSPITALIST

## 2022-07-18 PROCEDURE — 94761 N-INVAS EAR/PLS OXIMETRY MLT: CPT

## 2022-07-18 PROCEDURE — 82803 BLOOD GASES ANY COMBINATION: CPT

## 2022-07-18 PROCEDURE — 87636 SARSCOV2 & INF A&B AMP PRB: CPT | Performed by: EMERGENCY MEDICINE

## 2022-07-18 PROCEDURE — 6520001 HSPC ROUTINE CARE

## 2022-07-18 PROCEDURE — 36415 COLL VENOUS BLD VENIPUNCTURE: CPT

## 2022-07-18 PROCEDURE — 71045 X-RAY EXAM CHEST 1 VIEW: CPT

## 2022-07-18 PROCEDURE — 36600 WITHDRAWAL OF ARTERIAL BLOOD: CPT

## 2022-07-18 PROCEDURE — 71260 CT THORAX DX C+: CPT

## 2022-07-18 PROCEDURE — 93005 ELECTROCARDIOGRAM TRACING: CPT | Performed by: EMERGENCY MEDICINE

## 2022-07-18 PROCEDURE — 87040 BLOOD CULTURE FOR BACTERIA: CPT | Performed by: EMERGENCY MEDICINE

## 2022-07-18 PROCEDURE — 25010000002 IOPAMIDOL 61 % SOLUTION: Performed by: EMERGENCY MEDICINE

## 2022-07-18 RX ORDER — SODIUM CHLORIDE 0.9 % (FLUSH) 0.9 %
10 SYRINGE (ML) INJECTION AS NEEDED
Status: DISCONTINUED | OUTPATIENT
Start: 2022-07-18 | End: 2022-07-29 | Stop reason: HOSPADM

## 2022-07-18 RX ORDER — SODIUM CHLORIDE 0.9 % (FLUSH) 0.9 %
10 SYRINGE (ML) INJECTION EVERY 12 HOURS SCHEDULED
Status: DISCONTINUED | OUTPATIENT
Start: 2022-07-18 | End: 2022-07-29 | Stop reason: HOSPADM

## 2022-07-18 RX ORDER — HALOPERIDOL 5 MG/ML
1 INJECTION INTRAMUSCULAR EVERY 6 HOURS PRN
Status: DISCONTINUED | OUTPATIENT
Start: 2022-07-18 | End: 2022-07-28

## 2022-07-18 RX ORDER — SODIUM CHLORIDE 9 MG/ML
100 INJECTION, SOLUTION INTRAVENOUS CONTINUOUS
Status: DISCONTINUED | OUTPATIENT
Start: 2022-07-18 | End: 2022-07-19

## 2022-07-18 RX ORDER — NOREPINEPHRINE BIT/0.9 % NACL 8 MG/250ML
.02-.3 INFUSION BOTTLE (ML) INTRAVENOUS
Status: DISCONTINUED | OUTPATIENT
Start: 2022-07-18 | End: 2022-07-21

## 2022-07-18 RX ADMIN — SODIUM CHLORIDE 100 ML/HR: 9 INJECTION, SOLUTION INTRAVENOUS at 10:36

## 2022-07-18 RX ADMIN — SODIUM CHLORIDE 500 ML: 9 INJECTION, SOLUTION INTRAVENOUS at 11:12

## 2022-07-18 RX ADMIN — SODIUM CHLORIDE 100 ML/HR: 9 INJECTION, SOLUTION INTRAVENOUS at 13:27

## 2022-07-18 RX ADMIN — VANCOMYCIN HYDROCHLORIDE 1500 MG: 10 INJECTION, POWDER, LYOPHILIZED, FOR SOLUTION INTRAVENOUS at 11:54

## 2022-07-18 RX ADMIN — Medication 10 ML: at 21:00

## 2022-07-18 RX ADMIN — CEFTRIAXONE SODIUM 1 G: 1 INJECTION, POWDER, FOR SOLUTION INTRAMUSCULAR; INTRAVENOUS at 21:00

## 2022-07-18 RX ADMIN — PIPERACILLIN SODIUM AND TAZOBACTAM SODIUM 3.38 G: 3; .375 INJECTION, POWDER, LYOPHILIZED, FOR SOLUTION INTRAVENOUS at 10:43

## 2022-07-18 RX ADMIN — IOPAMIDOL 90 ML: 612 INJECTION, SOLUTION INTRAVENOUS at 11:27

## 2022-07-18 RX ADMIN — Medication 0.02 MCG/KG/MIN: at 14:33

## 2022-07-18 RX ADMIN — SODIUM CHLORIDE 500 ML: 9 INJECTION, SOLUTION INTRAVENOUS at 10:35

## 2022-07-18 NOTE — HOSPICE
Patient was being discharged today for cause since unable to see pt. and patient failure to follow hospice plan of care to allow hospice to provide services.  Had not been able to schedule a visit and when notified of planned visit patient was not available.  Unable to contact patient or caregivers or locate them at address they stated they would be staying.  Discussed d/c with Medical director/attending and director of Hospice.  Notified by  in ED that patient had signed into the ED with confusion.  Went to ED to notify patient of discharge.  He was headed into the Radiology department and when notified and shown documentation of cause he was unable to sign or voice clear understanding.  Staff stated someone had been with him but could not locate the caregiver/friend that brought him to the ED.  Copy of the discharge notice given to the  in the ED.  Radiology staff in the CT dept. present as well as the  when pt. notified of discharge.

## 2022-07-19 LAB
ALBUMIN SERPL-MCNC: 2.4 G/DL (ref 3.5–5.2)
ALBUMIN/GLOB SERPL: 0.6 G/DL
ALP SERPL-CCNC: 91 U/L (ref 39–117)
ALT SERPL W P-5'-P-CCNC: 9 U/L (ref 1–41)
ANION GAP SERPL CALCULATED.3IONS-SCNC: 10 MMOL/L (ref 5–15)
AST SERPL-CCNC: 30 U/L (ref 1–40)
BASOPHILS # BLD AUTO: 0.12 10*3/MM3 (ref 0–0.2)
BASOPHILS NFR BLD AUTO: 0.8 % (ref 0–1.5)
BILIRUB SERPL-MCNC: 0.7 MG/DL (ref 0–1.2)
BUN SERPL-MCNC: 26 MG/DL (ref 8–23)
BUN/CREAT SERPL: 17.6 (ref 7–25)
CALCIUM SPEC-SCNC: 7.6 MG/DL (ref 8.6–10.5)
CHLORIDE SERPL-SCNC: 107 MMOL/L (ref 98–107)
CO2 SERPL-SCNC: 23 MMOL/L (ref 22–29)
CREAT SERPL-MCNC: 1.48 MG/DL (ref 0.76–1.27)
DEPRECATED RDW RBC AUTO: 65.1 FL (ref 37–54)
EGFRCR SERPLBLD CKD-EPI 2021: 52.2 ML/MIN/1.73
EOSINOPHIL # BLD AUTO: 0.15 10*3/MM3 (ref 0–0.4)
EOSINOPHIL NFR BLD AUTO: 1 % (ref 0.3–6.2)
ERYTHROCYTE [DISTWIDTH] IN BLOOD BY AUTOMATED COUNT: 19.1 % (ref 12.3–15.4)
GLOBULIN UR ELPH-MCNC: 4 GM/DL
GLUCOSE SERPL-MCNC: 92 MG/DL (ref 65–99)
HCT VFR BLD AUTO: 30.9 % (ref 37.5–51)
HGB BLD-MCNC: 9.7 G/DL (ref 13–17.7)
IMM GRANULOCYTES # BLD AUTO: 0.11 10*3/MM3 (ref 0–0.05)
IMM GRANULOCYTES NFR BLD AUTO: 0.7 % (ref 0–0.5)
INR PPP: 6.87 (ref 0.8–1.2)
LYMPHOCYTES # BLD AUTO: 0.42 10*3/MM3 (ref 0.7–3.1)
LYMPHOCYTES NFR BLD AUTO: 2.8 % (ref 19.6–45.3)
MCH RBC QN AUTO: 29.3 PG (ref 26.6–33)
MCHC RBC AUTO-ENTMCNC: 31.4 G/DL (ref 31.5–35.7)
MCV RBC AUTO: 93.4 FL (ref 79–97)
MONOCYTES # BLD AUTO: 1.13 10*3/MM3 (ref 0.1–0.9)
MONOCYTES NFR BLD AUTO: 7.5 % (ref 5–12)
NEUTROPHILS NFR BLD AUTO: 13.16 10*3/MM3 (ref 1.7–7)
NEUTROPHILS NFR BLD AUTO: 87.2 % (ref 42.7–76)
NRBC BLD AUTO-RTO: 0 /100 WBC (ref 0–0.2)
PLATELET # BLD AUTO: 346 10*3/MM3 (ref 140–450)
PMV BLD AUTO: 10.1 FL (ref 6–12)
POTASSIUM SERPL-SCNC: 5.1 MMOL/L (ref 3.5–5.2)
PROT SERPL-MCNC: 6.4 G/DL (ref 6–8.5)
PROTHROMBIN TIME: 60 SECONDS (ref 11.1–15.3)
RBC # BLD AUTO: 3.31 10*6/MM3 (ref 4.14–5.8)
SODIUM SERPL-SCNC: 140 MMOL/L (ref 136–145)
WBC NRBC COR # BLD: 15.09 10*3/MM3 (ref 3.4–10.8)

## 2022-07-19 PROCEDURE — 85025 COMPLETE CBC W/AUTO DIFF WBC: CPT | Performed by: HOSPITALIST

## 2022-07-19 PROCEDURE — 80053 COMPREHEN METABOLIC PANEL: CPT | Performed by: HOSPITALIST

## 2022-07-19 PROCEDURE — 94799 UNLISTED PULMONARY SVC/PX: CPT

## 2022-07-19 PROCEDURE — 94760 N-INVAS EAR/PLS OXIMETRY 1: CPT

## 2022-07-19 PROCEDURE — 85610 PROTHROMBIN TIME: CPT | Performed by: HOSPITALIST

## 2022-07-19 PROCEDURE — 25010000002 CEFTRIAXONE PER 250 MG: Performed by: HOSPITALIST

## 2022-07-19 RX ADMIN — SODIUM CHLORIDE 100 ML/HR: 9 INJECTION, SOLUTION INTRAVENOUS at 15:24

## 2022-07-19 RX ADMIN — SODIUM CHLORIDE 100 ML/HR: 9 INJECTION, SOLUTION INTRAVENOUS at 04:40

## 2022-07-19 RX ADMIN — Medication 10 ML: at 21:12

## 2022-07-19 RX ADMIN — Medication 10 ML: at 09:20

## 2022-07-19 RX ADMIN — CEFTRIAXONE SODIUM 1 G: 1 INJECTION, POWDER, FOR SOLUTION INTRAMUSCULAR; INTRAVENOUS at 21:12

## 2022-07-20 LAB
ALBUMIN SERPL-MCNC: 2.2 G/DL (ref 3.5–5.2)
ALBUMIN/GLOB SERPL: 0.6 G/DL
ALP SERPL-CCNC: 76 U/L (ref 39–117)
ALT SERPL W P-5'-P-CCNC: 9 U/L (ref 1–41)
ANION GAP SERPL CALCULATED.3IONS-SCNC: 7 MMOL/L (ref 5–15)
AST SERPL-CCNC: 23 U/L (ref 1–40)
BACTERIA UR QL AUTO: ABNORMAL /HPF
BASOPHILS # BLD AUTO: 0.08 10*3/MM3 (ref 0–0.2)
BASOPHILS NFR BLD AUTO: 0.6 % (ref 0–1.5)
BILIRUB SERPL-MCNC: 0.6 MG/DL (ref 0–1.2)
BILIRUB UR QL STRIP: NEGATIVE
BUN SERPL-MCNC: 19 MG/DL (ref 8–23)
BUN/CREAT SERPL: 15.8 (ref 7–25)
CALCIUM SPEC-SCNC: 7.6 MG/DL (ref 8.6–10.5)
CHLORIDE SERPL-SCNC: 107 MMOL/L (ref 98–107)
CLARITY UR: ABNORMAL
CO2 SERPL-SCNC: 25 MMOL/L (ref 22–29)
COLOR UR: YELLOW
CREAT SERPL-MCNC: 1.2 MG/DL (ref 0.76–1.27)
DEPRECATED RDW RBC AUTO: 61.6 FL (ref 37–54)
EGFRCR SERPLBLD CKD-EPI 2021: 67.1 ML/MIN/1.73
EOSINOPHIL # BLD AUTO: 0.34 10*3/MM3 (ref 0–0.4)
EOSINOPHIL NFR BLD AUTO: 2.4 % (ref 0.3–6.2)
ERYTHROCYTE [DISTWIDTH] IN BLOOD BY AUTOMATED COUNT: 18.9 % (ref 12.3–15.4)
GLOBULIN UR ELPH-MCNC: 3.5 GM/DL
GLUCOSE BLDC GLUCOMTR-MCNC: 99 MG/DL (ref 70–130)
GLUCOSE SERPL-MCNC: 112 MG/DL (ref 65–99)
GLUCOSE UR STRIP-MCNC: NEGATIVE MG/DL
HCT VFR BLD AUTO: 27.3 % (ref 37.5–51)
HGB BLD-MCNC: 8.8 G/DL (ref 13–17.7)
HGB UR QL STRIP.AUTO: ABNORMAL
HYALINE CASTS UR QL AUTO: ABNORMAL /LPF
IMM GRANULOCYTES # BLD AUTO: 0.11 10*3/MM3 (ref 0–0.05)
IMM GRANULOCYTES NFR BLD AUTO: 0.8 % (ref 0–0.5)
INR PPP: 9.04 (ref 0.8–1.2)
KETONES UR QL STRIP: NEGATIVE
LEUKOCYTE ESTERASE UR QL STRIP.AUTO: ABNORMAL
LYMPHOCYTES # BLD AUTO: 0.49 10*3/MM3 (ref 0.7–3.1)
LYMPHOCYTES NFR BLD AUTO: 3.5 % (ref 19.6–45.3)
MCH RBC QN AUTO: 29.2 PG (ref 26.6–33)
MCHC RBC AUTO-ENTMCNC: 32.2 G/DL (ref 31.5–35.7)
MCV RBC AUTO: 90.7 FL (ref 79–97)
MONOCYTES # BLD AUTO: 0.83 10*3/MM3 (ref 0.1–0.9)
MONOCYTES NFR BLD AUTO: 5.9 % (ref 5–12)
NEUTROPHILS NFR BLD AUTO: 12.18 10*3/MM3 (ref 1.7–7)
NEUTROPHILS NFR BLD AUTO: 86.8 % (ref 42.7–76)
NITRITE UR QL STRIP: NEGATIVE
NRBC BLD AUTO-RTO: 0 /100 WBC (ref 0–0.2)
PH UR STRIP.AUTO: 5.5 [PH] (ref 5–9)
PLATELET # BLD AUTO: 304 10*3/MM3 (ref 140–450)
PMV BLD AUTO: 9.5 FL (ref 6–12)
POTASSIUM SERPL-SCNC: 4.7 MMOL/L (ref 3.5–5.2)
PROT SERPL-MCNC: 5.7 G/DL (ref 6–8.5)
PROT UR QL STRIP: ABNORMAL
PROTHROMBIN TIME: 74.6 SECONDS (ref 11.1–15.3)
RBC # BLD AUTO: 3.01 10*6/MM3 (ref 4.14–5.8)
RBC # UR STRIP: ABNORMAL /HPF
REF LAB TEST METHOD: ABNORMAL
SODIUM SERPL-SCNC: 139 MMOL/L (ref 136–145)
SP GR UR STRIP: 1.02 (ref 1–1.03)
SQUAMOUS #/AREA URNS HPF: ABNORMAL /HPF
UROBILINOGEN UR QL STRIP: ABNORMAL
WBC # UR STRIP: ABNORMAL /HPF
WBC NRBC COR # BLD: 14.03 10*3/MM3 (ref 3.4–10.8)

## 2022-07-20 PROCEDURE — 81001 URINALYSIS AUTO W/SCOPE: CPT | Performed by: HOSPITALIST

## 2022-07-20 PROCEDURE — 85610 PROTHROMBIN TIME: CPT | Performed by: HOSPITALIST

## 2022-07-20 PROCEDURE — 25010000002 CEFTRIAXONE PER 250 MG: Performed by: HOSPITALIST

## 2022-07-20 PROCEDURE — 85025 COMPLETE CBC W/AUTO DIFF WBC: CPT | Performed by: HOSPITALIST

## 2022-07-20 PROCEDURE — 80053 COMPREHEN METABOLIC PANEL: CPT | Performed by: HOSPITALIST

## 2022-07-20 RX ORDER — HEPARIN SODIUM (PORCINE) LOCK FLUSH IV SOLN 100 UNIT/ML 100 UNIT/ML
5 SOLUTION INTRAVENOUS AS NEEDED
Status: DISCONTINUED | OUTPATIENT
Start: 2022-07-20 | End: 2022-07-29 | Stop reason: HOSPADM

## 2022-07-20 RX ORDER — SODIUM CHLORIDE 0.9 % (FLUSH) 0.9 %
20 SYRINGE (ML) INJECTION AS NEEDED
Status: DISCONTINUED | OUTPATIENT
Start: 2022-07-20 | End: 2022-07-29 | Stop reason: HOSPADM

## 2022-07-20 RX ORDER — SODIUM CHLORIDE 0.9 % (FLUSH) 0.9 %
10 SYRINGE (ML) INJECTION AS NEEDED
Status: DISCONTINUED | OUTPATIENT
Start: 2022-07-20 | End: 2022-07-29 | Stop reason: HOSPADM

## 2022-07-20 RX ORDER — SODIUM CHLORIDE 0.9 % (FLUSH) 0.9 %
10 SYRINGE (ML) INJECTION EVERY 12 HOURS SCHEDULED
Status: DISCONTINUED | OUTPATIENT
Start: 2022-07-20 | End: 2022-07-29 | Stop reason: HOSPADM

## 2022-07-20 RX ADMIN — Medication 10 ML: at 10:40

## 2022-07-20 RX ADMIN — Medication 10 ML: at 08:18

## 2022-07-20 RX ADMIN — Medication 10 ML: at 21:12

## 2022-07-20 RX ADMIN — CEFTRIAXONE SODIUM 1 G: 1 INJECTION, POWDER, FOR SOLUTION INTRAMUSCULAR; INTRAVENOUS at 21:12

## 2022-07-21 PROBLEM — E43 SEVERE MALNUTRITION: Status: ACTIVE | Noted: 2022-07-21

## 2022-07-21 LAB
ALBUMIN SERPL-MCNC: 2.4 G/DL (ref 3.5–5.2)
ALBUMIN/GLOB SERPL: 0.6 G/DL
ALP SERPL-CCNC: 116 U/L (ref 39–117)
ALT SERPL W P-5'-P-CCNC: 9 U/L (ref 1–41)
ANION GAP SERPL CALCULATED.3IONS-SCNC: 8 MMOL/L (ref 5–15)
AST SERPL-CCNC: 23 U/L (ref 1–40)
BASOPHILS # BLD AUTO: 0.06 10*3/MM3 (ref 0–0.2)
BASOPHILS NFR BLD AUTO: 0.5 % (ref 0–1.5)
BILIRUB SERPL-MCNC: 0.7 MG/DL (ref 0–1.2)
BUN SERPL-MCNC: 15 MG/DL (ref 8–23)
BUN/CREAT SERPL: 15.2 (ref 7–25)
CALCIUM SPEC-SCNC: 8.2 MG/DL (ref 8.6–10.5)
CHLORIDE SERPL-SCNC: 105 MMOL/L (ref 98–107)
CO2 SERPL-SCNC: 25 MMOL/L (ref 22–29)
CREAT SERPL-MCNC: 0.99 MG/DL (ref 0.76–1.27)
DEPRECATED RDW RBC AUTO: 60 FL (ref 37–54)
EGFRCR SERPLBLD CKD-EPI 2021: 84.5 ML/MIN/1.73
EOSINOPHIL # BLD AUTO: 0.17 10*3/MM3 (ref 0–0.4)
EOSINOPHIL NFR BLD AUTO: 1.4 % (ref 0.3–6.2)
ERYTHROCYTE [DISTWIDTH] IN BLOOD BY AUTOMATED COUNT: 18.5 % (ref 12.3–15.4)
GLOBULIN UR ELPH-MCNC: 3.9 GM/DL
GLUCOSE SERPL-MCNC: 108 MG/DL (ref 65–99)
HCT VFR BLD AUTO: 29.3 % (ref 37.5–51)
HGB BLD-MCNC: 9.7 G/DL (ref 13–17.7)
IMM GRANULOCYTES # BLD AUTO: 0.07 10*3/MM3 (ref 0–0.05)
IMM GRANULOCYTES NFR BLD AUTO: 0.6 % (ref 0–0.5)
INR PPP: 3.47 (ref 0.8–1.2)
LYMPHOCYTES # BLD AUTO: 0.67 10*3/MM3 (ref 0.7–3.1)
LYMPHOCYTES NFR BLD AUTO: 5.4 % (ref 19.6–45.3)
MCH RBC QN AUTO: 29.7 PG (ref 26.6–33)
MCHC RBC AUTO-ENTMCNC: 33.1 G/DL (ref 31.5–35.7)
MCV RBC AUTO: 89.6 FL (ref 79–97)
MONOCYTES # BLD AUTO: 0.84 10*3/MM3 (ref 0.1–0.9)
MONOCYTES NFR BLD AUTO: 6.8 % (ref 5–12)
NEUTROPHILS NFR BLD AUTO: 10.61 10*3/MM3 (ref 1.7–7)
NEUTROPHILS NFR BLD AUTO: 85.3 % (ref 42.7–76)
NRBC BLD AUTO-RTO: 0 /100 WBC (ref 0–0.2)
PLATELET # BLD AUTO: 349 10*3/MM3 (ref 140–450)
PMV BLD AUTO: 10.2 FL (ref 6–12)
POTASSIUM SERPL-SCNC: 4.1 MMOL/L (ref 3.5–5.2)
PROT SERPL-MCNC: 6.3 G/DL (ref 6–8.5)
PROTHROMBIN TIME: 34.9 SECONDS (ref 11.1–15.3)
RBC # BLD AUTO: 3.27 10*6/MM3 (ref 4.14–5.8)
SODIUM SERPL-SCNC: 138 MMOL/L (ref 136–145)
WBC NRBC COR # BLD: 12.42 10*3/MM3 (ref 3.4–10.8)

## 2022-07-21 PROCEDURE — 25010000002 MORPHINE PER 10 MG: Performed by: HOSPITALIST

## 2022-07-21 PROCEDURE — 85610 PROTHROMBIN TIME: CPT | Performed by: HOSPITALIST

## 2022-07-21 PROCEDURE — 25010000002 LORAZEPAM PER 2 MG: Performed by: HOSPITALIST

## 2022-07-21 PROCEDURE — 85025 COMPLETE CBC W/AUTO DIFF WBC: CPT | Performed by: HOSPITALIST

## 2022-07-21 PROCEDURE — 80053 COMPREHEN METABOLIC PANEL: CPT | Performed by: HOSPITALIST

## 2022-07-21 RX ORDER — LORAZEPAM 2 MG/ML
1 INJECTION INTRAMUSCULAR EVERY 4 HOURS PRN
Status: DISCONTINUED | OUTPATIENT
Start: 2022-07-21 | End: 2022-07-22

## 2022-07-21 RX ORDER — MORPHINE SULFATE 15 MG/1
15 TABLET, FILM COATED, EXTENDED RELEASE ORAL EVERY 12 HOURS SCHEDULED
Status: COMPLETED | OUTPATIENT
Start: 2022-07-21 | End: 2022-07-28

## 2022-07-21 RX ADMIN — MORPHINE SULFATE 15 MG: 15 TABLET, EXTENDED RELEASE ORAL at 21:24

## 2022-07-21 RX ADMIN — Medication 10 ML: at 10:45

## 2022-07-21 RX ADMIN — Medication 10 ML: at 20:00

## 2022-07-21 RX ADMIN — Medication 10 ML: at 10:46

## 2022-07-21 RX ADMIN — LORAZEPAM 1 MG: 2 INJECTION, SOLUTION INTRAMUSCULAR; INTRAVENOUS at 19:57

## 2022-07-21 RX ADMIN — MORPHINE SULFATE 4 MG: 4 INJECTION, SOLUTION INTRAMUSCULAR; INTRAVENOUS at 18:48

## 2022-07-22 PROCEDURE — 25010000002 MORPHINE PER 10 MG: Performed by: HOSPITALIST

## 2022-07-22 PROCEDURE — 25010000002 LORAZEPAM PER 2 MG: Performed by: HOSPITALIST

## 2022-07-22 RX ORDER — LORAZEPAM 2 MG/ML
1 INJECTION INTRAMUSCULAR
Status: DISCONTINUED | OUTPATIENT
Start: 2022-07-22 | End: 2022-07-28

## 2022-07-22 RX ADMIN — MORPHINE SULFATE 15 MG: 15 TABLET, EXTENDED RELEASE ORAL at 20:59

## 2022-07-22 RX ADMIN — Medication 10 ML: at 21:00

## 2022-07-22 RX ADMIN — MORPHINE SULFATE 4 MG: 4 INJECTION, SOLUTION INTRAMUSCULAR; INTRAVENOUS at 01:41

## 2022-07-22 RX ADMIN — MORPHINE SULFATE 4 MG: 4 INJECTION, SOLUTION INTRAMUSCULAR; INTRAVENOUS at 09:41

## 2022-07-22 RX ADMIN — LORAZEPAM 1 MG: 2 INJECTION, SOLUTION INTRAMUSCULAR; INTRAVENOUS at 09:24

## 2022-07-22 RX ADMIN — MORPHINE SULFATE 4 MG: 4 INJECTION, SOLUTION INTRAMUSCULAR; INTRAVENOUS at 08:44

## 2022-07-22 RX ADMIN — Medication 10 ML: at 08:00

## 2022-07-22 RX ADMIN — LORAZEPAM 1 MG: 2 INJECTION, SOLUTION INTRAMUSCULAR; INTRAVENOUS at 06:02

## 2022-07-22 RX ADMIN — MORPHINE SULFATE 4 MG: 4 INJECTION, SOLUTION INTRAMUSCULAR; INTRAVENOUS at 10:15

## 2022-07-22 RX ADMIN — MORPHINE SULFATE 4 MG: 4 INJECTION, SOLUTION INTRAMUSCULAR; INTRAVENOUS at 11:49

## 2022-07-22 RX ADMIN — LORAZEPAM 1 MG: 2 INJECTION, SOLUTION INTRAMUSCULAR; INTRAVENOUS at 00:07

## 2022-07-22 RX ADMIN — MORPHINE SULFATE 4 MG: 4 INJECTION, SOLUTION INTRAMUSCULAR; INTRAVENOUS at 10:50

## 2022-07-22 RX ADMIN — Medication 10 ML: at 22:26

## 2022-07-22 RX ADMIN — MORPHINE SULFATE 15 MG: 15 TABLET, EXTENDED RELEASE ORAL at 08:00

## 2022-07-23 LAB
BACTERIA SPEC AEROBE CULT: NORMAL
BACTERIA SPEC AEROBE CULT: NORMAL

## 2022-07-23 PROCEDURE — 25010000002 MORPHINE PER 10 MG: Performed by: HOSPITALIST

## 2022-07-23 PROCEDURE — 25010000002 LORAZEPAM PER 2 MG: Performed by: HOSPITALIST

## 2022-07-23 RX ADMIN — MORPHINE SULFATE 4 MG: 4 INJECTION, SOLUTION INTRAMUSCULAR; INTRAVENOUS at 02:56

## 2022-07-23 RX ADMIN — Medication 10 ML: at 09:43

## 2022-07-23 RX ADMIN — MORPHINE SULFATE 4 MG: 4 INJECTION, SOLUTION INTRAMUSCULAR; INTRAVENOUS at 09:56

## 2022-07-23 RX ADMIN — Medication 10 ML: at 09:44

## 2022-07-23 RX ADMIN — MORPHINE SULFATE 4 MG: 4 INJECTION, SOLUTION INTRAMUSCULAR; INTRAVENOUS at 12:10

## 2022-07-23 RX ADMIN — Medication 10 ML: at 20:46

## 2022-07-23 RX ADMIN — MORPHINE SULFATE 4 MG: 4 INJECTION, SOLUTION INTRAMUSCULAR; INTRAVENOUS at 14:10

## 2022-07-23 RX ADMIN — MORPHINE SULFATE 15 MG: 15 TABLET, EXTENDED RELEASE ORAL at 08:16

## 2022-07-23 RX ADMIN — MORPHINE SULFATE 15 MG: 15 TABLET, EXTENDED RELEASE ORAL at 20:46

## 2022-07-23 RX ADMIN — LORAZEPAM 1 MG: 2 INJECTION, SOLUTION INTRAMUSCULAR; INTRAVENOUS at 20:49

## 2022-07-24 PROCEDURE — 94760 N-INVAS EAR/PLS OXIMETRY 1: CPT

## 2022-07-24 PROCEDURE — 25010000002 MORPHINE PER 10 MG: Performed by: HOSPITALIST

## 2022-07-24 PROCEDURE — 25010000002 LORAZEPAM PER 2 MG: Performed by: HOSPITALIST

## 2022-07-24 PROCEDURE — 94799 UNLISTED PULMONARY SVC/PX: CPT

## 2022-07-24 RX ADMIN — Medication 10 ML: at 20:03

## 2022-07-24 RX ADMIN — MORPHINE SULFATE 4 MG: 4 INJECTION, SOLUTION INTRAMUSCULAR; INTRAVENOUS at 04:21

## 2022-07-24 RX ADMIN — Medication 10 ML: at 09:50

## 2022-07-24 RX ADMIN — MORPHINE SULFATE 15 MG: 15 TABLET, EXTENDED RELEASE ORAL at 09:50

## 2022-07-24 RX ADMIN — MORPHINE SULFATE 15 MG: 15 TABLET, EXTENDED RELEASE ORAL at 20:02

## 2022-07-24 RX ADMIN — LORAZEPAM 1 MG: 2 INJECTION, SOLUTION INTRAMUSCULAR; INTRAVENOUS at 13:35

## 2022-07-25 PROCEDURE — 25010000002 LORAZEPAM PER 2 MG: Performed by: HOSPITALIST

## 2022-07-25 PROCEDURE — 25010000002 MORPHINE PER 10 MG: Performed by: HOSPITALIST

## 2022-07-25 RX ADMIN — Medication 10 ML: at 21:10

## 2022-07-25 RX ADMIN — Medication 10 ML: at 08:57

## 2022-07-25 RX ADMIN — MORPHINE SULFATE 15 MG: 15 TABLET, EXTENDED RELEASE ORAL at 21:10

## 2022-07-25 RX ADMIN — Medication 10 ML: at 08:56

## 2022-07-25 RX ADMIN — MORPHINE SULFATE 4 MG: 4 INJECTION, SOLUTION INTRAMUSCULAR; INTRAVENOUS at 19:39

## 2022-07-25 RX ADMIN — LORAZEPAM 1 MG: 2 INJECTION, SOLUTION INTRAMUSCULAR; INTRAVENOUS at 09:02

## 2022-07-25 RX ADMIN — MORPHINE SULFATE 4 MG: 4 INJECTION, SOLUTION INTRAMUSCULAR; INTRAVENOUS at 01:18

## 2022-07-25 RX ADMIN — MORPHINE SULFATE 15 MG: 15 TABLET, EXTENDED RELEASE ORAL at 08:56

## 2022-07-25 RX ADMIN — LORAZEPAM 1 MG: 2 INJECTION, SOLUTION INTRAMUSCULAR; INTRAVENOUS at 19:39

## 2022-07-26 PROCEDURE — 25010000002 LORAZEPAM PER 2 MG: Performed by: HOSPITALIST

## 2022-07-26 PROCEDURE — 25010000002 MORPHINE PER 10 MG: Performed by: HOSPITALIST

## 2022-07-26 RX ADMIN — LORAZEPAM 1 MG: 2 INJECTION, SOLUTION INTRAMUSCULAR; INTRAVENOUS at 12:01

## 2022-07-26 RX ADMIN — Medication 10 ML: at 09:24

## 2022-07-26 RX ADMIN — Medication 10 ML: at 22:05

## 2022-07-26 RX ADMIN — MORPHINE SULFATE 4 MG: 4 INJECTION, SOLUTION INTRAMUSCULAR; INTRAVENOUS at 01:05

## 2022-07-26 RX ADMIN — MORPHINE SULFATE 15 MG: 15 TABLET, EXTENDED RELEASE ORAL at 22:05

## 2022-07-26 RX ADMIN — MORPHINE SULFATE 15 MG: 15 TABLET, EXTENDED RELEASE ORAL at 09:23

## 2022-07-26 RX ADMIN — MORPHINE SULFATE 4 MG: 4 INJECTION, SOLUTION INTRAMUSCULAR; INTRAVENOUS at 18:25

## 2022-07-27 PROCEDURE — 25010000002 LORAZEPAM PER 2 MG: Performed by: HOSPITALIST

## 2022-07-27 PROCEDURE — 25010000002 MORPHINE PER 10 MG: Performed by: HOSPITALIST

## 2022-07-27 RX ADMIN — MORPHINE SULFATE 15 MG: 15 TABLET, EXTENDED RELEASE ORAL at 08:32

## 2022-07-27 RX ADMIN — MORPHINE SULFATE 4 MG: 4 INJECTION, SOLUTION INTRAMUSCULAR; INTRAVENOUS at 03:13

## 2022-07-27 RX ADMIN — MORPHINE SULFATE 4 MG: 4 INJECTION, SOLUTION INTRAMUSCULAR; INTRAVENOUS at 23:18

## 2022-07-27 RX ADMIN — MORPHINE SULFATE 4 MG: 4 INJECTION, SOLUTION INTRAMUSCULAR; INTRAVENOUS at 13:26

## 2022-07-27 RX ADMIN — MORPHINE SULFATE 15 MG: 15 TABLET, EXTENDED RELEASE ORAL at 20:59

## 2022-07-27 RX ADMIN — Medication 10 ML: at 20:59

## 2022-07-27 RX ADMIN — MORPHINE SULFATE 4 MG: 4 INJECTION, SOLUTION INTRAMUSCULAR; INTRAVENOUS at 00:27

## 2022-07-27 RX ADMIN — LORAZEPAM 1 MG: 2 INJECTION, SOLUTION INTRAMUSCULAR; INTRAVENOUS at 23:26

## 2022-07-27 RX ADMIN — Medication 10 ML: at 09:12

## 2022-07-28 LAB
FLUAV SUBTYP SPEC NAA+PROBE: NOT DETECTED
FLUBV RNA ISLT QL NAA+PROBE: NOT DETECTED
SARS-COV-2 RNA PNL SPEC NAA+PROBE: DETECTED
SARS-COV-2 RNA RESP QL NAA+PROBE: NOT DETECTED

## 2022-07-28 PROCEDURE — 93010 ELECTROCARDIOGRAM REPORT: CPT | Performed by: INTERNAL MEDICINE

## 2022-07-28 PROCEDURE — 93005 ELECTROCARDIOGRAM TRACING: CPT | Performed by: INTERNAL MEDICINE

## 2022-07-28 PROCEDURE — 97162 PT EVAL MOD COMPLEX 30 MIN: CPT

## 2022-07-28 PROCEDURE — 87636 SARSCOV2 & INF A&B AMP PRB: CPT | Performed by: HOSPITALIST

## 2022-07-28 PROCEDURE — 87635 SARS-COV-2 COVID-19 AMP PRB: CPT | Performed by: INTERNAL MEDICINE

## 2022-07-28 PROCEDURE — 97166 OT EVAL MOD COMPLEX 45 MIN: CPT

## 2022-07-28 PROCEDURE — 25010000002 LORAZEPAM PER 2 MG: Performed by: HOSPITALIST

## 2022-07-28 PROCEDURE — 25010000002 MORPHINE PER 10 MG: Performed by: HOSPITALIST

## 2022-07-28 RX ORDER — LORAZEPAM 0.5 MG/1
1 TABLET ORAL
Status: DISCONTINUED | OUTPATIENT
Start: 2022-07-28 | End: 2022-07-29 | Stop reason: HOSPADM

## 2022-07-28 RX ORDER — MORPHINE SULFATE 10 MG/5ML
2.5 SOLUTION ORAL
Status: DISCONTINUED | OUTPATIENT
Start: 2022-07-28 | End: 2022-07-29 | Stop reason: HOSPADM

## 2022-07-28 RX ORDER — HALOPERIDOL 1 MG/1
1 TABLET ORAL EVERY 6 HOURS PRN
Status: DISCONTINUED | OUTPATIENT
Start: 2022-07-28 | End: 2022-07-29 | Stop reason: HOSPADM

## 2022-07-28 RX ADMIN — MORPHINE SULFATE 4 MG: 4 INJECTION, SOLUTION INTRAMUSCULAR; INTRAVENOUS at 06:30

## 2022-07-28 RX ADMIN — LORAZEPAM 1 MG: 0.5 TABLET ORAL at 20:38

## 2022-07-28 RX ADMIN — MORPHINE SULFATE 2.5 MG: 10 SOLUTION ORAL at 20:37

## 2022-07-28 RX ADMIN — MORPHINE SULFATE 4 MG: 4 INJECTION, SOLUTION INTRAMUSCULAR; INTRAVENOUS at 05:42

## 2022-07-28 RX ADMIN — LORAZEPAM 1 MG: 2 INJECTION, SOLUTION INTRAMUSCULAR; INTRAVENOUS at 06:30

## 2022-07-28 RX ADMIN — MORPHINE SULFATE 15 MG: 15 TABLET, EXTENDED RELEASE ORAL at 09:01

## 2022-07-28 RX ADMIN — Medication 10 ML: at 20:00

## 2022-07-29 ENCOUNTER — APPOINTMENT (OUTPATIENT)
Dept: CARDIOLOGY | Facility: HOSPITAL | Age: 67
End: 2022-07-29

## 2022-07-29 VITALS
HEIGHT: 64 IN | BODY MASS INDEX: 28.85 KG/M2 | SYSTOLIC BLOOD PRESSURE: 149 MMHG | TEMPERATURE: 98.2 F | OXYGEN SATURATION: 96 % | RESPIRATION RATE: 20 BRPM | WEIGHT: 169 LBS | DIASTOLIC BLOOD PRESSURE: 53 MMHG | HEART RATE: 74 BPM

## 2022-07-29 LAB
FLUAV SUBTYP SPEC NAA+PROBE: NOT DETECTED
FLUBV RNA ISLT QL NAA+PROBE: NOT DETECTED
SARS-COV-2 RNA PNL SPEC NAA+PROBE: NOT DETECTED

## 2022-07-29 PROCEDURE — 87636 SARSCOV2 & INF A&B AMP PRB: CPT | Performed by: HOSPITALIST

## 2022-07-29 PROCEDURE — 94799 UNLISTED PULMONARY SVC/PX: CPT

## 2022-07-29 PROCEDURE — 25010000002 HEPARIN LOCK FLUSH PER 10 UNITS: Performed by: HOSPITALIST

## 2022-07-29 PROCEDURE — 97535 SELF CARE MNGMENT TRAINING: CPT

## 2022-07-29 PROCEDURE — 94760 N-INVAS EAR/PLS OXIMETRY 1: CPT

## 2022-07-29 RX ORDER — MORPHINE SULFATE 15 MG/1
1 TABLET ORAL
Qty: 48 TABLET | Refills: 0 | Status: SHIPPED | OUTPATIENT
Start: 2022-07-29

## 2022-07-29 RX ORDER — HALOPERIDOL 1 MG/1
1 TABLET ORAL EVERY 6 HOURS PRN
Qty: 30 TABLET | Refills: 1 | Status: SHIPPED | OUTPATIENT
Start: 2022-07-29 | End: 2022-07-29 | Stop reason: SDUPTHER

## 2022-07-29 RX ORDER — HALOPERIDOL 1 MG/1
1 TABLET ORAL EVERY 6 HOURS PRN
Qty: 30 TABLET | Refills: 1 | Status: SHIPPED | OUTPATIENT
Start: 2022-07-29

## 2022-07-29 RX ORDER — HALOPERIDOL 2 MG/ML
1 SOLUTION ORAL
Qty: 10 ML | Refills: 1 | Status: SHIPPED | OUTPATIENT
Start: 2022-07-29

## 2022-07-29 RX ADMIN — LORAZEPAM 1 MG: 0.5 TABLET ORAL at 07:03

## 2022-07-29 RX ADMIN — LORAZEPAM 1 MG: 0.5 TABLET ORAL at 15:55

## 2022-07-29 RX ADMIN — HEPARIN 500 UNITS: 100 SYRINGE at 16:14

## 2022-07-29 RX ADMIN — MORPHINE SULFATE 2.5 MG: 10 SOLUTION ORAL at 10:33

## 2022-07-29 RX ADMIN — MORPHINE SULFATE 2.5 MG: 10 SOLUTION ORAL at 00:17

## 2022-07-29 RX ADMIN — MORPHINE SULFATE 2.5 MG: 10 SOLUTION ORAL at 04:28

## 2022-07-29 RX ADMIN — MORPHINE SULFATE 2.5 MG: 10 SOLUTION ORAL at 08:58

## 2022-07-29 RX ADMIN — LORAZEPAM 1 MG: 0.5 TABLET ORAL at 08:58

## 2022-07-29 RX ADMIN — MORPHINE SULFATE 2.5 MG: 10 SOLUTION ORAL at 15:55

## 2022-07-29 RX ADMIN — LORAZEPAM 1 MG: 0.5 TABLET ORAL at 10:33

## 2022-08-04 ENCOUNTER — TRANSCRIBE ORDERS (OUTPATIENT)
Dept: HOME HEALTH SERVICES | Facility: CLINIC | Age: 67
End: 2022-08-04

## 2022-08-04 ENCOUNTER — HOME CARE VISIT (OUTPATIENT)
Dept: HOSPICE | Facility: HOSPICE | Age: 67
End: 2022-08-04

## 2022-08-04 ENCOUNTER — HOME CARE VISIT (OUTPATIENT)
Dept: HOME HEALTH SERVICES | Facility: CLINIC | Age: 67
End: 2022-08-04

## 2022-08-04 ENCOUNTER — HOSPICE ADMISSION (OUTPATIENT)
Dept: HOSPICE | Facility: HOSPICE | Age: 67
End: 2022-08-04

## 2022-08-04 DIAGNOSIS — C20 MALIGNANT NEOPLASM OF RECTUM: Primary | ICD-10-CM
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
## 2022-08-05 LAB
QT INTERVAL: 392 MS
QTC INTERVAL: 525 MS